# Patient Record
Sex: MALE | URBAN - METROPOLITAN AREA
[De-identification: names, ages, dates, MRNs, and addresses within clinical notes are randomized per-mention and may not be internally consistent; named-entity substitution may affect disease eponyms.]

---

## 2019-08-01 ENCOUNTER — OFFICE VISIT (OUTPATIENT)
Dept: UROLOGY | Age: 44
End: 2019-08-01

## 2019-08-01 VITALS
OXYGEN SATURATION: 97 % | DIASTOLIC BLOOD PRESSURE: 74 MMHG | BODY MASS INDEX: 31.21 KG/M2 | WEIGHT: 218 LBS | HEIGHT: 70 IN | HEART RATE: 68 BPM | SYSTOLIC BLOOD PRESSURE: 126 MMHG

## 2019-08-01 DIAGNOSIS — I86.1 VARICOCELE: Primary | ICD-10-CM

## 2019-08-01 LAB
BILIRUB UR QL STRIP: NEGATIVE
GLUCOSE UR-MCNC: NEGATIVE MG/DL
KETONES P FAST UR STRIP-MCNC: NEGATIVE MG/DL
PH UR STRIP: 6 [PH] (ref 4.6–8)
PROT UR QL STRIP: NEGATIVE
SP GR UR STRIP: 1.02 (ref 1–1.03)
UA UROBILINOGEN AMB POC: NORMAL (ref 0.2–1)
URINALYSIS CLARITY POC: CLEAR
URINALYSIS COLOR POC: YELLOW
URINE BLOOD POC: NEGATIVE
URINE LEUKOCYTES POC: NEGATIVE
URINE NITRITES POC: NEGATIVE

## 2019-08-01 RX ORDER — LIDOCAINE 50 MG/G
PATCH TOPICAL EVERY 24 HOURS
COMMUNITY

## 2019-08-01 RX ORDER — TELMISARTAN 40 MG/1
40 TABLET ORAL DAILY
COMMUNITY

## 2019-08-01 RX ORDER — DICLOFENAC SODIUM 10 MG/G
GEL TOPICAL 4 TIMES DAILY
COMMUNITY

## 2019-08-01 RX ORDER — DULOXETIN HYDROCHLORIDE 60 MG/1
60 CAPSULE, DELAYED RELEASE ORAL DAILY
COMMUNITY

## 2019-08-01 NOTE — PROGRESS NOTES
Lu Cleary    Chief Complaint   Patient presents with    New Patient     scrotal varicoceles       History and Physical    The patient is a 76-year-old  male who comes with intermittent left scrotal discomfort. This been going on for many years without significant change. It is not present when he is in bed or when he awakens in the morning and tends to come up during the day and is worse when he is on his feet for a longer period of time. The patient did have a vasectomy in the distant past.  When the patient was stationed in the [employer] in [facility] he had an ultrasound done which apparently reported a varicocele. The patient had a recent ultrasound done at [facility] which showed varicocele. I do not have access to that imaging study or report    The patient does not have any urinary irritative or obstructive symptoms. Past Medical History:   Diagnosis Date    Depression     Hypertension      There is no problem list on file for this patient. Past Surgical History:   Procedure Laterality Date    HX VASECTOMY       Current Outpatient Medications   Medication Sig Dispense Refill    telmisartan (MICARDIS) 40 mg tablet Take 40 mg by mouth daily.  DULoxetine (CYMBALTA) 60 mg capsule Take 60 mg by mouth daily.  omega 3-dha-epa-fish oil (FISH OIL) 100-160-1,000 mg cap Take  by mouth.  fluticasone propionate (FLONASE ALLERGY RELIEF NA) by Nasal route.  lidocaine (LIDODERM) 5 % by TransDERmal route every twenty-four (24) hours. Apply patch to the affected area for 12 hours a day and remove for 12 hours a day.  diclofenac (VOLTAREN) 1 % gel Apply  to affected area four (4) times daily.        No Known Allergies  Social History     Socioeconomic History    Marital status: UNKNOWN     Spouse name: Not on file    Number of children: Not on file    Years of education: Not on file    Highest education level: Not on file   Occupational History    Not on file   Social Needs  Financial resource strain: Not on [employer] insecurity:     Worry: Not on file     Inability: Not on file    Transportation needs:     Medical: Not on file     Non-medical: Not on file   Tobacco Use    Smoking status: Never Smoker    Smokeless tobacco: Never Used   Substance and Sexual Activity    Alcohol use: Yes    Drug use: Never    Sexual activity: Not Currently   Lifestyle    Physical activity:     Days per week: Not on file     Minutes per session: Not on file    Stress: Not on file   Relationships    Social connections:     Talks on phone: Not on file     Gets together: Not on file     Attends Christianity service: Not on file     Active member of club or organization: Not on file     Attends meetings of clubs or organizations: Not on file     Relationship status: Not on file    Intimate partner violence:     Fear of current or ex partner: Not on file     Emotionally abused: Not on file     Physically abused: Not on file     Forced sexual activity: Not on file   Other Topics Concern    Not on file   Social History Narrative    Not on file      Family History   Problem Relation Age of Onset    Hypertension Mother     Hypertension Father     Stroke Father                  Visit Vitals  /74 (BP 1 Location: Left arm, BP Patient Position: Sitting)   Pulse 68   Ht 5' 10\" (1.778 m)   Wt 218 lb (98.9 kg)   SpO2 97%   BMI 31.28 kg/m²     Physical        Gen: WDWN adult NAD  Head  : normocephalic,  Normal ROM; eyes with normal pupils, EOMs, no masses;  conjunctiva normal  Neck: normal movement,  no evident mass,  No evident adenopathy, trachea midline,  Lungs: no respiratory distress or difficulties  CV:  No evident peripheral swelling  Abd :bowel sounds normal, no masses, tenderness, organomegaly  Flanks     -penis is normal.  No hernia. Testes are normal size and texture bilaterally. The epididymis are slightly thickened but equal on both sides.   The patient is examined upright with and without Valsalva. The veins are of essentially normal equal size bilaterally and there is only a predictable amount of thickening when the patient does a Valsalva maneuver. There is no irregularity consistent with clinical varicocele    Extremities- no edema, arthritis, deformity, swelling  Psych- oriented, no evident anxiety, no cognitive impairment evident          Urine analysis is negative        Impression/ PLAN  The patient has upright left scrotal pain and has been told he had a varicocele. However, the patient also notes that the pain tends to go around the outside of the hip as the day goes on and the patient also has some back issues. I have advised the patient that he might have a varicocele on ultrasound if it was done with Doppler capability but there is nothing here a large enough that would account for his discomfort and this could easily be a hip problem or could be a little bit of congestive epididymitis. Plan:  I discussed this in detail with the patient and advised that he take interventional Aleve and if this is, as the patient suggests, episodic then he could take Aleve only when he needs it. If he continues to have discomfort or this does not work the patient will obtain the ultrasound on a disc and return and we will also do a pencil Doppler    The patient is also advised that he should consider beginning getting a digital rectal exam and PSA done through the [employer], because he is a retired         This visit exceeded 30 minutes and >50% was counselling  The patient understands the discussion and plan    PLEASE NOTE:      This document has been produced using voice recognition software.   Unrecognized errors in transcription may be present    Fede Soliman MD

## 2019-08-01 NOTE — PATIENT INSTRUCTIONS
Urine Test: About This Test  What is it? A urine test checks the color, clarity (clear or cloudy), odor, concentration, and acidity (pH) of your urine. It also checks your levels of protein, sugar, blood cells, or other substances in your urine. This test is sometimes called a urinalysis. Why is this test done? A urine test may be done:  · To check for a disease or infection of the urinary tract. The urinary tract includes the kidneys, the tubes that carry urine from the kidneys to the bladder (ureters), and the bladder. It also includes the tube that carries urine from the bladder to outside the body (urethra). · To check the treatment of conditions such as diabetes, kidney stones, a urinary tract infection (UTI), high blood pressure, or some kidney or liver diseases. How can you prepare for the test?  · Before the test, don't eat foods that can change the color of your urine. Examples of these include blackberries, beets, and rhubarb. · Don't do heavy exercise before the test.  · Tell your doctor if you are menstruating or close to starting your period. Your doctor may want to wait to do the test.  · Tell your doctor about all the nonprescription and prescription medicines and herbs or other supplements you take. Some of these can affect the results of this test.  What happens during the test?  A urine test can be done in your doctor's office, clinic, or lab. Or you may be asked to collect a urine sample at home. Then you can take it to the office or lab for testing. Clean-catch midstream urine collection  · Wash your hands before you start. · If the cup you are given has a lid, remove it carefully. Set it down with the inner surface up. Don't touch the inside of the cup with your fingers. · Clean the area around your genitals. ? For men: Pull back the foreskin, if present. Clean the head of your penis with medicated towelettes or swabs. ?  For women: Spread open the genital folds of skin with one hand. Then use medicated towelettes or swabs in your other hand to clean the area where urine comes out (the urethra). Wipe the area from front to back. · Start urinating into the toilet or urinal. A woman should hold apart the genital folds of skin while she urinates. · After the urine has flowed for several seconds, place the cup into the urine stream. Collect about 2 ounces of urine without stopping your flow of urine. · Don't touch the rim of the cup to your genital area. Don't get toilet paper, pubic hair, stool (feces), menstrual blood, or anything else in the urine sample. · Finish urinating into the toilet or urinal.  · Carefully replace and tighten the lid on the cup, and then return it to the lab. If you are collecting the urine at home and can't get it to the lab in an hour, refrigerate it. Double-voided urine sample collection  This method collects the urine your body is making right now. · Urinate into the toilet or urinal. Don't collect any of this urine. · Drink a large glass of water, and wait about 30 to 40 minutes. · Then get a urine sample. Follow the instructions above for collecting a clean-catch urine sample. · Take the urine sample to the lab. If you are collecting the urine at home and can't get it to the lab in an hour, refrigerate it. Follow-up care is a key part of your treatment and safety. Be sure to make and go to all appointments, and call your doctor if you are having problems. It's also a good idea to keep a list of the medicines you take. Ask your doctor when you can expect to have your test results. Where can you learn more? Go to http://jamey-chapin.info/. Enter R266 in the search box to learn more about \"Urine Test: About This Test.\"  Current as of: March 28, 2019  Content Version: 12.1  © 9811-4604 [employer].  Care instructions adapted under license by [employer] (which disclaims liability or warranty for this information). If you have questions about a medical condition or this instruction, always ask your healthcare professional. [place] [address] any warranty or liability for your use of this information.